# Patient Record
Sex: FEMALE | Race: WHITE | NOT HISPANIC OR LATINO | URBAN - METROPOLITAN AREA
[De-identification: names, ages, dates, MRNs, and addresses within clinical notes are randomized per-mention and may not be internally consistent; named-entity substitution may affect disease eponyms.]

---

## 2024-06-18 ENCOUNTER — TELEPHONE (OUTPATIENT)
Dept: PEDIATRIC CARDIOLOGY | Facility: CLINIC | Age: 24
End: 2024-06-18

## 2024-06-18 NOTE — TELEPHONE ENCOUNTER
"[facility] Call Center    Phone Message    May a detailed message be left on voicemail: yes     Reason for Call: Other:  Patient called stating she saw Dr Tyson at a \"pop up\" clinic and told Dr Tyson about the family cardiac history and Dr Tyson said to call get registered and then make an appt. Since patient is 23 years old and a new patient sending a message to the clinic to see what the next steps are. Patient would like a call please.    Action Taken: Other: Cardio    Travel Screening: Not Applicable     Date of Service:                                                                      "

## 2024-06-24 NOTE — TELEPHONE ENCOUNTER
Spoke with Dr Tyson and she would like to see this patient with an Echo & EKG in the clinic. I called and left a message to schedule next available appointment.

## 2024-07-31 DIAGNOSIS — Z82.49 FAMILY HISTORY OF CARDIAC DISORDER: Primary | ICD-10-CM
